# Patient Record
Sex: MALE | Race: WHITE | NOT HISPANIC OR LATINO | URBAN - METROPOLITAN AREA
[De-identification: names, ages, dates, MRNs, and addresses within clinical notes are randomized per-mention and may not be internally consistent; named-entity substitution may affect disease eponyms.]

---

## 2018-01-01 ENCOUNTER — EMERGENCY (EMERGENCY)
Facility: HOSPITAL | Age: 48
LOS: 1 days | End: 2018-01-01
Attending: EMERGENCY MEDICINE | Admitting: EMERGENCY MEDICINE
Payer: SELF-PAY

## 2018-01-01 DIAGNOSIS — I46.9 CARDIAC ARREST, CAUSE UNSPECIFIED: ICD-10-CM

## 2018-01-01 DIAGNOSIS — I21.3 ST ELEVATION (STEMI) MYOCARDIAL INFARCTION OF UNSPECIFIED SITE: ICD-10-CM

## 2018-01-01 LAB
BLD GP AB SCN SERPL QL: NEGATIVE — SIGNIFICANT CHANGE UP
RH IG SCN BLD-IMP: POSITIVE — SIGNIFICANT CHANGE UP

## 2018-01-01 PROCEDURE — 93010 ELECTROCARDIOGRAM REPORT: CPT | Mod: XU

## 2018-01-01 PROCEDURE — 99285 EMERGENCY DEPT VISIT HI MDM: CPT | Mod: 25

## 2018-01-01 PROCEDURE — 92950 HEART/LUNG RESUSCITATION CPR: CPT

## 2018-01-01 RX ORDER — AMIODARONE HYDROCHLORIDE 400 MG/1
1 TABLET ORAL
Qty: 900 | Refills: 0 | Status: DISCONTINUED | OUTPATIENT
Start: 2018-01-01 | End: 2018-02-26

## 2018-01-01 RX ORDER — AMIODARONE HYDROCHLORIDE 400 MG/1
150 TABLET ORAL ONCE
Qty: 0 | Refills: 0 | Status: COMPLETED | OUTPATIENT
Start: 2018-01-01 | End: 2018-01-01

## 2018-01-01 RX ADMIN — AMIODARONE HYDROCHLORIDE 600 MILLIGRAM(S): 400 TABLET ORAL at 18:49

## 2018-01-01 RX ADMIN — AMIODARONE HYDROCHLORIDE 33.33 MG/MIN: 400 TABLET ORAL at 20:19

## 2018-02-22 NOTE — ED PROVIDER NOTE - MEDICAL DECISION MAKING DETAILS
pt w/ cardiac arrest upon arrival, cpr/resuscitation began (see detailed resus note), ROSC achieved, ekg c/w STEMI, cath lab activated, however, pt lost pulse and cpr started again.  pt  despite resus effort at 1909.  ME called

## 2018-02-22 NOTE — ED PROVIDER NOTE - PHYSICAL EXAMINATION
CON: sp intubation, HENMT: ET tube in place, HEAD: atraumatic, CV: pulseless upon arrival, RESP: pt on vent, GI: soft abd, SKIN: no rash, MSK: no deformities, NEURO: limited assessment 2/2 intubated status CON: sp intubation, HENMT: ET tube in place, HEAD: atraumatic, noted facial abrasions on exam, CV: pulseless upon arrival, RESP: pt on vent, GI: soft abd, SKIN: no rash, MSK: no deformities, NEURO: limited assessment 2/2 intubated status

## 2018-02-22 NOTE — ED ADULT TRIAGE NOTE - OTHER COMPLAINTS
pt presents to ed in PEA, CPR in progress by ems, pt intubated in the field. as per ems, pt collapsed while at work approx 45 min ago. 1 amp of amiodarone and 4 epi given prior to arrival. cpr in progress

## 2018-02-22 NOTE — ED ADULT NURSE NOTE - OBJECTIVE STATEMENT
Pt BIBA, CPR in progress. Per EMS Patient was complaining of heartburn during the day and collapsed at work.  EMS administered Epi and amiodarone in field. CPR continued in ED.  ACLS continued in ED Resus.  Pt ROSC achieved at 1834 Afib, when back into arrest at 1845.  ROSC achieved again at 1848 went back in to arrest at 1852.  ACLS continued until TOD called at 1909 pt was in asystole. Pt BIBA, CPR in progress. Per EMS Patient was complaining of heartburn during the day and collapsed at work.  EMS administered Epi and amiodarone in field. CPR continued in ED.  ACLS continued in ED Resus.  Pt ROSC achieved at 1834 Afib, when back into arrest at 1845.  ROSC achieved again at 1849 went back in to arrest at 1852.  ACLS continued until TOD called at 1909 pt was in asystole.

## 2018-02-22 NOTE — ED PROVIDER NOTE - CARE PLAN
Principal Discharge DX:	Cardiac arrest  Secondary Diagnosis:	STEMI (ST elevation myocardial infarction)

## 2018-02-22 NOTE — ED PROVIDER NOTE - OBJECTIVE STATEMENT
47 yom bibems for cardiac arrest.  per witness, pt reports feeling cp/heart burn today, syncopized at work.  upon EMS arrival, pt was in cardiac arrest, resus began in field, intubated PTA.  pt rec'd 5 amps of epinephrine and 7 shocks, however, prior to arriving in ED was pulseless.  cpr continued.

## 2018-02-22 NOTE — ED ADULT NURSE REASSESSMENT NOTE - NS ED NURSE REASSESS COMMENT FT1
ACLS protocol maintained upon arrival until TOD.  Pt arrived with intubation in place by EMS.  IV access in place by EMS.  CPR in progress by EMS on arrival.

## 2018-02-24 PROCEDURE — 86900 BLOOD TYPING SEROLOGIC ABO: CPT

## 2018-02-24 PROCEDURE — 92950 HEART/LUNG RESUSCITATION CPR: CPT

## 2018-02-24 PROCEDURE — 85025 COMPLETE CBC W/AUTO DIFF WBC: CPT

## 2018-02-24 PROCEDURE — 96374 THER/PROPH/DIAG INJ IV PUSH: CPT | Mod: XU

## 2018-02-24 PROCEDURE — 86901 BLOOD TYPING SEROLOGIC RH(D): CPT

## 2018-02-24 PROCEDURE — 85730 THROMBOPLASTIN TIME PARTIAL: CPT

## 2018-02-24 PROCEDURE — 85610 PROTHROMBIN TIME: CPT

## 2018-02-24 PROCEDURE — 93005 ELECTROCARDIOGRAM TRACING: CPT | Mod: XU

## 2018-02-24 PROCEDURE — 82550 ASSAY OF CK (CPK): CPT

## 2018-02-24 PROCEDURE — 99285 EMERGENCY DEPT VISIT HI MDM: CPT | Mod: 25

## 2018-02-24 PROCEDURE — 82553 CREATINE MB FRACTION: CPT

## 2018-02-24 PROCEDURE — 80053 COMPREHEN METABOLIC PANEL: CPT

## 2018-02-24 PROCEDURE — 86850 RBC ANTIBODY SCREEN: CPT

## 2018-02-24 PROCEDURE — 84484 ASSAY OF TROPONIN QUANT: CPT

## 2021-04-08 NOTE — ED ADULT NURSE NOTE - PRIMARY CARE PROVIDER
What Type Of Note Output Would You Prefer (Optional)?: Standard Output
Hpi Title: Evaluation of Skin Lesions
How Severe Are Your Spot(S)?: mild
Have Your Spot(S) Been Treated In The Past?: has not been treated
Non affiliated
